# Patient Record
Sex: FEMALE | Race: WHITE | Employment: OTHER | URBAN - METROPOLITAN AREA
[De-identification: names, ages, dates, MRNs, and addresses within clinical notes are randomized per-mention and may not be internally consistent; named-entity substitution may affect disease eponyms.]

---

## 2017-05-02 ENCOUNTER — TRANSFERRED RECORDS (OUTPATIENT)
Dept: HEALTH INFORMATION MANAGEMENT | Facility: CLINIC | Age: 82
End: 2017-05-02

## 2017-05-02 LAB
ALT SERPL-CCNC: 46 U/L (ref 14–63)
AST SERPL-CCNC: 32 U/L (ref 15–37)
CREAT SERPL-MCNC: 0.93 MG/DL (ref 0.51–0.95)
GFR SERPL CREATININE-BSD FRML MDRD: 53 ML/MIN/1.73M2 (ref 60–150)
GLUCOSE SERPL-MCNC: 140 MG/DL (ref 74–100)
POTASSIUM SERPL-SCNC: 4.6 MMOL/L (ref 3.5–5.1)

## 2018-09-18 ENCOUNTER — RECORDS - HEALTHEAST (OUTPATIENT)
Dept: LAB | Facility: CLINIC | Age: 83
End: 2018-09-18

## 2018-09-19 LAB
ANION GAP SERPL CALCULATED.3IONS-SCNC: 6 MMOL/L (ref 5–18)
BUN SERPL-MCNC: 18 MG/DL (ref 8–28)
CALCIUM SERPL-MCNC: 9.4 MG/DL (ref 8.5–10.5)
CHLORIDE BLD-SCNC: 99 MMOL/L (ref 98–107)
CO2 SERPL-SCNC: 31 MMOL/L (ref 22–31)
CREAT SERPL-MCNC: 0.72 MG/DL (ref 0.6–1.1)
ERYTHROCYTE [DISTWIDTH] IN BLOOD BY AUTOMATED COUNT: 13.2 % (ref 11–14.5)
GFR SERPL CREATININE-BSD FRML MDRD: >60 ML/MIN/1.73M2
GLUCOSE BLD-MCNC: 137 MG/DL (ref 70–125)
HCT VFR BLD AUTO: 42.2 % (ref 35–47)
HGB BLD-MCNC: 14.2 G/DL (ref 12–16)
MCH RBC QN AUTO: 31.5 PG (ref 27–34)
MCHC RBC AUTO-ENTMCNC: 33.6 G/DL (ref 32–36)
MCV RBC AUTO: 94 FL (ref 80–100)
PLATELET # BLD AUTO: 183 THOU/UL (ref 140–440)
PMV BLD AUTO: 13 FL (ref 8.5–12.5)
POTASSIUM BLD-SCNC: 4.7 MMOL/L (ref 3.5–5)
RBC # BLD AUTO: 4.51 MILL/UL (ref 3.8–5.4)
SODIUM SERPL-SCNC: 136 MMOL/L (ref 136–145)
WBC: 6.2 THOU/UL (ref 4–11)

## 2018-12-14 ENCOUNTER — TRANSFERRED RECORDS (OUTPATIENT)
Dept: HEALTH INFORMATION MANAGEMENT | Facility: CLINIC | Age: 83
End: 2018-12-14

## 2018-12-14 LAB
CREAT SERPL-MCNC: 0.7 MG/DL (ref 0.51–0.95)
GLUCOSE SERPL-MCNC: 107 MG/DL (ref 74–100)
POTASSIUM SERPL-SCNC: 4.2 MMOL/L (ref 3.5–5.1)

## 2019-04-25 ENCOUNTER — RECORDS - HEALTHEAST (OUTPATIENT)
Dept: LAB | Facility: CLINIC | Age: 84
End: 2019-04-25

## 2019-04-25 LAB
ALBUMIN UR-MCNC: ABNORMAL MG/DL
APPEARANCE UR: ABNORMAL
BACTERIA #/AREA URNS HPF: ABNORMAL HPF
BILIRUB UR QL STRIP: NEGATIVE
COLOR UR AUTO: YELLOW
GLUCOSE UR STRIP-MCNC: NEGATIVE MG/DL
HGB UR QL STRIP: ABNORMAL
KETONES UR STRIP-MCNC: NEGATIVE MG/DL
LEUKOCYTE ESTERASE UR QL STRIP: ABNORMAL
NITRATE UR QL: POSITIVE
PH UR STRIP: 6 [PH] (ref 4.5–8)
RBC #/AREA URNS AUTO: ABNORMAL HPF
SP GR UR STRIP: 1.04 (ref 1–1.03)
SQUAMOUS #/AREA URNS AUTO: ABNORMAL LPF
UROBILINOGEN UR STRIP-ACNC: ABNORMAL
WBC #/AREA URNS AUTO: >100 HPF
WBC CLUMPS #/AREA URNS HPF: PRESENT /[HPF]

## 2019-04-27 LAB — BACTERIA SPEC CULT: ABNORMAL

## 2019-05-16 ENCOUNTER — HOSPITAL ENCOUNTER (INPATIENT)
Facility: CLINIC | Age: 84
LOS: 1 days | Discharge: HOME HEALTH | DRG: 641 | End: 2019-05-17
Attending: EMERGENCY MEDICINE | Admitting: HOSPITALIST
Payer: COMMERCIAL

## 2019-05-16 ENCOUNTER — APPOINTMENT (OUTPATIENT)
Dept: GENERAL RADIOLOGY | Facility: CLINIC | Age: 84
DRG: 641 | End: 2019-05-16
Attending: EMERGENCY MEDICINE
Payer: COMMERCIAL

## 2019-05-16 ENCOUNTER — APPOINTMENT (OUTPATIENT)
Dept: ULTRASOUND IMAGING | Facility: CLINIC | Age: 84
DRG: 641 | End: 2019-05-16
Attending: HOSPITALIST
Payer: COMMERCIAL

## 2019-05-16 ENCOUNTER — APPOINTMENT (OUTPATIENT)
Dept: CT IMAGING | Facility: CLINIC | Age: 84
DRG: 641 | End: 2019-05-16
Attending: EMERGENCY MEDICINE
Payer: COMMERCIAL

## 2019-05-16 DIAGNOSIS — I47.19 MULTIFOCAL ATRIAL TACHYCARDIA (H): ICD-10-CM

## 2019-05-16 DIAGNOSIS — R60.0 LOWER EXTREMITY EDEMA: ICD-10-CM

## 2019-05-16 DIAGNOSIS — J90 BILATERAL PLEURAL EFFUSION: ICD-10-CM

## 2019-05-16 DIAGNOSIS — R07.1 CHEST PAIN ON BREATHING: ICD-10-CM

## 2019-05-16 PROBLEM — I50.9 ACUTE HEART FAILURE (H): Status: ACTIVE | Noted: 2019-05-16

## 2019-05-16 LAB
ALBUMIN SERPL-MCNC: 2.9 G/DL (ref 3.4–5)
ALBUMIN UR-MCNC: 30 MG/DL
ALP SERPL-CCNC: 94 U/L (ref 40–150)
ALT SERPL W P-5'-P-CCNC: 21 U/L (ref 0–50)
ANION GAP SERPL CALCULATED.3IONS-SCNC: 6 MMOL/L (ref 3–14)
APPEARANCE UR: CLEAR
AST SERPL W P-5'-P-CCNC: 18 U/L (ref 0–45)
BASOPHILS # BLD AUTO: 0.1 10E9/L (ref 0–0.2)
BASOPHILS NFR BLD AUTO: 1.6 %
BILIRUB SERPL-MCNC: 0.7 MG/DL (ref 0.2–1.3)
BILIRUB UR QL STRIP: NEGATIVE
BUN SERPL-MCNC: 26 MG/DL (ref 7–30)
CALCIUM SERPL-MCNC: 8.8 MG/DL (ref 8.5–10.1)
CHLORIDE SERPL-SCNC: 105 MMOL/L (ref 94–109)
CO2 SERPL-SCNC: 31 MMOL/L (ref 20–32)
COLOR UR AUTO: YELLOW
CREAT SERPL-MCNC: 0.66 MG/DL (ref 0.52–1.04)
D DIMER PPP FEU-MCNC: 1.5 UG/ML FEU (ref 0–0.5)
DIFFERENTIAL METHOD BLD: NORMAL
EOSINOPHIL # BLD AUTO: 0.2 10E9/L (ref 0–0.7)
EOSINOPHIL NFR BLD AUTO: 2.3 %
ERYTHROCYTE [DISTWIDTH] IN BLOOD BY AUTOMATED COUNT: 13.8 % (ref 10–15)
GFR SERPL CREATININE-BSD FRML MDRD: 74 ML/MIN/{1.73_M2}
GLUCOSE SERPL-MCNC: 97 MG/DL (ref 70–99)
GLUCOSE UR STRIP-MCNC: NEGATIVE MG/DL
HCT VFR BLD AUTO: 38.7 % (ref 35–47)
HGB BLD-MCNC: 12.7 G/DL (ref 11.7–15.7)
HGB UR QL STRIP: NEGATIVE
IMM GRANULOCYTES # BLD: 0 10E9/L (ref 0–0.4)
IMM GRANULOCYTES NFR BLD: 0.1 %
INTERPRETATION ECG - MUSE: NORMAL
KETONES UR STRIP-MCNC: NEGATIVE MG/DL
LEUKOCYTE ESTERASE UR QL STRIP: ABNORMAL
LIPASE SERPL-CCNC: 50 U/L (ref 73–393)
LYMPHOCYTES # BLD AUTO: 1.1 10E9/L (ref 0.8–5.3)
LYMPHOCYTES NFR BLD AUTO: 15.6 %
MAGNESIUM SERPL-MCNC: 2 MG/DL (ref 1.6–2.3)
MCH RBC QN AUTO: 30 PG (ref 26.5–33)
MCHC RBC AUTO-ENTMCNC: 32.8 G/DL (ref 31.5–36.5)
MCV RBC AUTO: 91 FL (ref 78–100)
MONOCYTES # BLD AUTO: 0.5 10E9/L (ref 0–1.3)
MONOCYTES NFR BLD AUTO: 7.7 %
MUCOUS THREADS #/AREA URNS LPF: PRESENT /LPF
NEUTROPHILS # BLD AUTO: 5.1 10E9/L (ref 1.6–8.3)
NEUTROPHILS NFR BLD AUTO: 72.7 %
NITRATE UR QL: NEGATIVE
NRBC # BLD AUTO: 0 10*3/UL
NRBC BLD AUTO-RTO: 0 /100
NT-PROBNP SERPL-MCNC: 1011 PG/ML (ref 0–1800)
PH UR STRIP: 6.5 PH (ref 5–7)
PLATELET # BLD AUTO: 208 10E9/L (ref 150–450)
POTASSIUM SERPL-SCNC: 4.1 MMOL/L (ref 3.4–5.3)
PROT SERPL-MCNC: 6.4 G/DL (ref 6.8–8.8)
RBC # BLD AUTO: 4.24 10E12/L (ref 3.8–5.2)
RBC #/AREA URNS AUTO: 2 /HPF (ref 0–2)
SODIUM SERPL-SCNC: 142 MMOL/L (ref 133–144)
SOURCE: ABNORMAL
SP GR UR STRIP: 1.02 (ref 1–1.03)
SQUAMOUS #/AREA URNS AUTO: 1 /HPF (ref 0–1)
TROPONIN I SERPL-MCNC: <0.015 UG/L (ref 0–0.04)
TROPONIN I SERPL-MCNC: <0.015 UG/L (ref 0–0.04)
TSH SERPL DL<=0.005 MIU/L-ACNC: 1.04 MU/L (ref 0.4–4)
UROBILINOGEN UR STRIP-MCNC: 2 MG/DL (ref 0–2)
WBC # BLD AUTO: 7 10E9/L (ref 4–11)
WBC #/AREA URNS AUTO: 10 /HPF (ref 0–5)

## 2019-05-16 PROCEDURE — 93005 ELECTROCARDIOGRAM TRACING: CPT

## 2019-05-16 PROCEDURE — 25000132 ZZH RX MED GY IP 250 OP 250 PS 637: Performed by: HOSPITALIST

## 2019-05-16 PROCEDURE — 21000001 ZZH R&B HEART CARE

## 2019-05-16 PROCEDURE — 83880 ASSAY OF NATRIURETIC PEPTIDE: CPT | Performed by: EMERGENCY MEDICINE

## 2019-05-16 PROCEDURE — 96374 THER/PROPH/DIAG INJ IV PUSH: CPT | Mod: 59

## 2019-05-16 PROCEDURE — 74177 CT ABD & PELVIS W/CONTRAST: CPT

## 2019-05-16 PROCEDURE — 80053 COMPREHEN METABOLIC PANEL: CPT | Performed by: EMERGENCY MEDICINE

## 2019-05-16 PROCEDURE — 96361 HYDRATE IV INFUSION ADD-ON: CPT

## 2019-05-16 PROCEDURE — 25000128 H RX IP 250 OP 636: Performed by: EMERGENCY MEDICINE

## 2019-05-16 PROCEDURE — 84484 ASSAY OF TROPONIN QUANT: CPT | Performed by: HOSPITALIST

## 2019-05-16 PROCEDURE — 71046 X-RAY EXAM CHEST 2 VIEWS: CPT

## 2019-05-16 PROCEDURE — 25000128 H RX IP 250 OP 636: Performed by: HOSPITALIST

## 2019-05-16 PROCEDURE — 87086 URINE CULTURE/COLONY COUNT: CPT | Performed by: EMERGENCY MEDICINE

## 2019-05-16 PROCEDURE — 81001 URINALYSIS AUTO W/SCOPE: CPT | Performed by: EMERGENCY MEDICINE

## 2019-05-16 PROCEDURE — 25800030 ZZH RX IP 258 OP 636: Performed by: EMERGENCY MEDICINE

## 2019-05-16 PROCEDURE — 25000125 ZZHC RX 250: Performed by: EMERGENCY MEDICINE

## 2019-05-16 PROCEDURE — 99223 1ST HOSP IP/OBS HIGH 75: CPT | Mod: AI | Performed by: HOSPITALIST

## 2019-05-16 PROCEDURE — 84484 ASSAY OF TROPONIN QUANT: CPT | Performed by: EMERGENCY MEDICINE

## 2019-05-16 PROCEDURE — 85379 FIBRIN DEGRADATION QUANT: CPT | Performed by: EMERGENCY MEDICINE

## 2019-05-16 PROCEDURE — 83690 ASSAY OF LIPASE: CPT | Performed by: EMERGENCY MEDICINE

## 2019-05-16 PROCEDURE — 71260 CT THORAX DX C+: CPT

## 2019-05-16 PROCEDURE — 99285 EMERGENCY DEPT VISIT HI MDM: CPT | Mod: 25

## 2019-05-16 PROCEDURE — 83735 ASSAY OF MAGNESIUM: CPT | Performed by: EMERGENCY MEDICINE

## 2019-05-16 PROCEDURE — 93970 EXTREMITY STUDY: CPT

## 2019-05-16 PROCEDURE — 84443 ASSAY THYROID STIM HORMONE: CPT | Performed by: HOSPITALIST

## 2019-05-16 PROCEDURE — 85025 COMPLETE CBC W/AUTO DIFF WBC: CPT | Performed by: EMERGENCY MEDICINE

## 2019-05-16 RX ORDER — METOPROLOL TARTRATE 1 MG/ML
5 INJECTION, SOLUTION INTRAVENOUS ONCE
Status: DISCONTINUED | OUTPATIENT
Start: 2019-05-16 | End: 2019-05-16

## 2019-05-16 RX ORDER — PROCHLORPERAZINE MALEATE 5 MG
5 TABLET ORAL EVERY 6 HOURS PRN
Status: DISCONTINUED | OUTPATIENT
Start: 2019-05-16 | End: 2019-05-17 | Stop reason: HOSPADM

## 2019-05-16 RX ORDER — POTASSIUM CL/LIDO/0.9 % NACL 10MEQ/0.1L
10 INTRAVENOUS SOLUTION, PIGGYBACK (ML) INTRAVENOUS
Status: DISCONTINUED | OUTPATIENT
Start: 2019-05-16 | End: 2019-05-17 | Stop reason: HOSPADM

## 2019-05-16 RX ORDER — POTASSIUM CHLORIDE 7.45 MG/ML
10 INJECTION INTRAVENOUS
Status: DISCONTINUED | OUTPATIENT
Start: 2019-05-16 | End: 2019-05-17 | Stop reason: HOSPADM

## 2019-05-16 RX ORDER — MAGNESIUM SULFATE HEPTAHYDRATE 40 MG/ML
2 INJECTION, SOLUTION INTRAVENOUS DAILY PRN
Status: DISCONTINUED | OUTPATIENT
Start: 2019-05-16 | End: 2019-05-17 | Stop reason: HOSPADM

## 2019-05-16 RX ORDER — LIDOCAINE 40 MG/G
CREAM TOPICAL
Status: DISCONTINUED | OUTPATIENT
Start: 2019-05-16 | End: 2019-05-17 | Stop reason: HOSPADM

## 2019-05-16 RX ORDER — ONDANSETRON 4 MG/1
4 TABLET, ORALLY DISINTEGRATING ORAL EVERY 6 HOURS PRN
Status: DISCONTINUED | OUTPATIENT
Start: 2019-05-16 | End: 2019-05-17 | Stop reason: HOSPADM

## 2019-05-16 RX ORDER — NALOXONE HYDROCHLORIDE 0.4 MG/ML
.1-.4 INJECTION, SOLUTION INTRAMUSCULAR; INTRAVENOUS; SUBCUTANEOUS
Status: DISCONTINUED | OUTPATIENT
Start: 2019-05-16 | End: 2019-05-17 | Stop reason: HOSPADM

## 2019-05-16 RX ORDER — FUROSEMIDE 10 MG/ML
40 INJECTION INTRAMUSCULAR; INTRAVENOUS ONCE
Status: COMPLETED | OUTPATIENT
Start: 2019-05-16 | End: 2019-05-16

## 2019-05-16 RX ORDER — POTASSIUM CHLORIDE 1.5 G/1.58G
20-40 POWDER, FOR SOLUTION ORAL
Status: DISCONTINUED | OUTPATIENT
Start: 2019-05-16 | End: 2019-05-17 | Stop reason: HOSPADM

## 2019-05-16 RX ORDER — POTASSIUM CHLORIDE 29.8 MG/ML
20 INJECTION INTRAVENOUS
Status: DISCONTINUED | OUTPATIENT
Start: 2019-05-16 | End: 2019-05-16

## 2019-05-16 RX ORDER — ONDANSETRON 2 MG/ML
4 INJECTION INTRAMUSCULAR; INTRAVENOUS EVERY 6 HOURS PRN
Status: DISCONTINUED | OUTPATIENT
Start: 2019-05-16 | End: 2019-05-17 | Stop reason: HOSPADM

## 2019-05-16 RX ORDER — MAGNESIUM SULFATE HEPTAHYDRATE 40 MG/ML
4 INJECTION, SOLUTION INTRAVENOUS EVERY 4 HOURS PRN
Status: DISCONTINUED | OUTPATIENT
Start: 2019-05-16 | End: 2019-05-17 | Stop reason: HOSPADM

## 2019-05-16 RX ORDER — PROCHLORPERAZINE 25 MG
12.5 SUPPOSITORY, RECTAL RECTAL EVERY 12 HOURS PRN
Status: DISCONTINUED | OUTPATIENT
Start: 2019-05-16 | End: 2019-05-17 | Stop reason: HOSPADM

## 2019-05-16 RX ORDER — METOCLOPRAMIDE HYDROCHLORIDE 5 MG/ML
5 INJECTION INTRAMUSCULAR; INTRAVENOUS EVERY 6 HOURS PRN
Status: DISCONTINUED | OUTPATIENT
Start: 2019-05-16 | End: 2019-05-17 | Stop reason: HOSPADM

## 2019-05-16 RX ORDER — POTASSIUM CHLORIDE 1500 MG/1
20-40 TABLET, EXTENDED RELEASE ORAL
Status: DISCONTINUED | OUTPATIENT
Start: 2019-05-16 | End: 2019-05-17 | Stop reason: HOSPADM

## 2019-05-16 RX ORDER — METOCLOPRAMIDE 5 MG/1
5 TABLET ORAL EVERY 6 HOURS PRN
Status: DISCONTINUED | OUTPATIENT
Start: 2019-05-16 | End: 2019-05-17 | Stop reason: HOSPADM

## 2019-05-16 RX ORDER — ACETAMINOPHEN 325 MG/1
650 TABLET ORAL EVERY 4 HOURS PRN
Status: DISCONTINUED | OUTPATIENT
Start: 2019-05-16 | End: 2019-05-17 | Stop reason: HOSPADM

## 2019-05-16 RX ORDER — IOPAMIDOL 755 MG/ML
52 INJECTION, SOLUTION INTRAVASCULAR ONCE
Status: COMPLETED | OUTPATIENT
Start: 2019-05-16 | End: 2019-05-16

## 2019-05-16 RX ADMIN — FUROSEMIDE 40 MG: 10 INJECTION, SOLUTION INTRAVENOUS at 20:24

## 2019-05-16 RX ADMIN — ACETAMINOPHEN 650 MG: 325 TABLET, FILM COATED ORAL at 22:58

## 2019-05-16 RX ADMIN — SODIUM CHLORIDE, POTASSIUM CHLORIDE, SODIUM LACTATE AND CALCIUM CHLORIDE 500 ML: 600; 310; 30; 20 INJECTION, SOLUTION INTRAVENOUS at 16:29

## 2019-05-16 RX ADMIN — SODIUM CHLORIDE 80 ML: 9 INJECTION, SOLUTION INTRAVENOUS at 17:47

## 2019-05-16 RX ADMIN — IOPAMIDOL 52 ML: 755 INJECTION, SOLUTION INTRAVENOUS at 17:47

## 2019-05-16 SDOH — HEALTH STABILITY: MENTAL HEALTH: HOW OFTEN DO YOU HAVE A DRINK CONTAINING ALCOHOL?: NEVER

## 2019-05-16 ASSESSMENT — ENCOUNTER SYMPTOMS
CHILLS: 1
BLOOD IN STOOL: 0
DIARRHEA: 0
PALPITATIONS: 0
HEADACHES: 0
WOUND: 0
NAUSEA: 0
VOMITING: 0
MYALGIAS: 0
WHEEZING: 0
ABDOMINAL DISTENTION: 0
SHORTNESS OF BREATH: 1
CONSTIPATION: 0
COUGH: 0
FEVER: 0
ABDOMINAL PAIN: 1
APPETITE CHANGE: 1

## 2019-05-16 ASSESSMENT — MIFFLIN-ST. JEOR: SCORE: 842.2

## 2019-05-16 NOTE — ED PROVIDER NOTES
"  History     Chief Complaint:  Abdominal Pain and Chest Pain      HPI   Ingrid Portillo is a 89 year old female who presents to the emergency department for evaluation of abdominal pain and chest pain. ***     Allergies:  ***  Allergies not on file     Medications:    ***    No current outpatient medications on file.    Problem List:    ***  There are no active problems to display for this patient.       Past Medical History:    ***  History reviewed. No pertinent past medical history.    Past Surgical History:    ***  History reviewed. No pertinent surgical history.    Family History:    ***  History reviewed. No pertinent family history.    Social History:  The patient was accompanied to the ED by ***.  Smoking Status: Current every day smoker 0.25 PPD  Smokeless Tobacco: Never  Alcohol Use: Never  Drug use: Never     Review of Systems  ***    Physical Exam     Patient Vitals for the past 24 hrs:   BP Temp Temp src Pulse Heart Rate Resp SpO2 Height Weight   05/16/19 1522 (!) 140/94 -- -- -- 114 19 96 % -- --   05/16/19 1500 -- -- -- -- 117 24 -- -- --   05/16/19 1417 123/79 97.8  F (36.6  C) Oral 85 -- 20 91 % 1.626 m (5' 4\") 46.7 kg (103 lb)      Physical Exam  ***  Emergency Department Course     ECG:  ***    Imaging:  Radiology findings were communicated with the {Patient/MD:[id number]} who voiced understanding of the findings.  ***    Laboratory:  Laboratory findings were communicated with the {Patient/MD:[id number]} who voiced understanding of the findings.  ***    Procedures:  ***    Interventions:  ***  Medications - No data to display     Emergency Department Course:  Nursing notes and vitals reviewed.    1604: I performed an exam of the patient as documented above.     I personally reviewed the laboratory *** results with the {PATIENT, FAMILY MEMBER, CAREGIVER:[id number]} and answered all related questions prior to *** discharge.    Impression & Plan       {trauma activation?:457211::\" \"}  CMS Diagnoses: " "{Sepsis/Septic Shock/Stemi/Stroke:719212::\" \"}       Medical Decision Making:  ***  Critical Care time:  {none or minutes:379614::\"none\"}    Diagnosis:  No diagnosis found.    Disposition:  {discharged to home/discharged to home with.../Admitted to...:618396}    Discharge Medications:     Medication List      There are no discharge medications for this visit.           Heather Saravia  5/16/2019    EMERGENCY DEPARTMENT    "

## 2019-05-16 NOTE — ED TRIAGE NOTES
Pt woke up at 530am with epigastric pain. Has not gone away. C/o SOB/WAITE. Given nitroX2 and 162mg asa by EMS.

## 2019-05-16 NOTE — ED PROVIDER NOTES
History     Chief Complaint:  Abdominal Pain and Chest Pain    HPI:  The history is provided by the patient.      Ingrid Portillo is a 89 year old female who presents with 2 days of increasing chest and abdominal discomfort.  Patient notes that there is a heaviness over the lower aspect of the chest and epigastric region as well as pain diffusely throughout the abdomen.  She notes symptoms are worse with movement but cannot identify any other exacerbating or relieving factors.  She denies any fevers.  No nausea vomiting or diarrhea.  She reports normal urination and bowel movements.  She notes chronic urinary frequency but no change in this.  She denies any fall or trauma.  She notes that her chest pain is pleuritic and associated with shortness of breath.  No cough.  Patient also endorses increased right lower extremity swelling over the last week.  She denies any radiation of pain.  She reports that she is never had symptoms like this previously.  She denies any other symptoms at this time.    Allergies:  No known drug allergies      Medications:    The patient is not currently taking any prescribed medications.     Past Medical History:    The patient does not have any past pertinent medical history.     Past Surgical History:    History reviewed. No pertinent surgical history.     Family History:    History reviewed. No pertinent family history.      Social History:  The patient is accompanied to the ED by family  Smoking status: current every day smoker, 0.25 ppd, started at age 18  Alcohol use: Negative      Review of Systems   Constitutional: Positive for appetite change and chills. Negative for fever.   HENT: Negative for congestion.    Respiratory: Positive for shortness of breath. Negative for cough and wheezing.    Cardiovascular: Positive for chest pain and leg swelling. Negative for palpitations.   Gastrointestinal: Positive for abdominal pain. Negative for abdominal distention, blood in stool,  "constipation, diarrhea, nausea and vomiting.   Musculoskeletal: Negative for myalgias.   Skin: Negative for rash and wound.   Neurological: Negative for syncope and headaches.   All other systems reviewed and are negative.      Physical Exam     Patient Vitals for the past 24 hrs:   BP Temp Temp src Pulse Heart Rate Resp SpO2 Height Weight   05/16/19 1850 (!) 173/104 -- -- -- 108 18 -- -- --   05/16/19 1830 -- -- -- -- 110 12 -- -- --   05/16/19 1800 (!) 161/97 -- -- -- 161 14 94 % -- --   05/16/19 1730 -- -- -- -- 101 24 -- -- --   05/16/19 1634 (!) 145/94 -- -- -- 105 13 -- -- --   05/16/19 1522 (!) 140/94 -- -- -- 114 19 96 % -- --   05/16/19 1500 -- -- -- -- 117 24 -- -- --   05/16/19 1417 123/79 97.8  F (36.6  C) Oral 85 -- 20 91 % 1.626 m (5' 4\") 46.7 kg (103 lb)        Physical Exam  General: Well appearing, nontoxic. Resting comfortably  Head:  Scalp, face, and head appear normal  Eyes:  Pupils are equal, round, and reactive to light    Conjunctivae non-injected and sclerae white  ENT:    The external nose is normal    Pinnae are normal    The oropharynx is normal, mucous membranes moist    Uvula is in the midline  Neck:  Normal range of motion    There is no rigidity noted    Trachea is in the midline  CV:  Tachycardic and irregularly irregular    Normal S1/S2, no S3/S4    No murmur or rub. Radial pulses 2+ bilaterally   Resp:  Lungs are equal bilaterally, breath sounds distant.    There is no tachypnea    No increased work of breathing    No wheezing, or rhonchi.  Coarse expiratory rales in the bilateral lung fields.  GI:  Abdomen is soft, no rigidity or guarding    Mild distension, or mass    Mild diffuse tenderness. No rebound tenderness   MS:  Normal muscular tone    Symmetric motor strength    3+ right lower extremity pitting edema. No edema left lower extremity. No calf pain.  Skin:  No rash or acute skin lesions noted  Neuro: Awake and alert.  No facial droop.  Strength 5 out of 5 and intact " throughout.    Speech is normal and fluent    Moves all extremities spontaneously  Psych:  Normal affect.  Appropriate interactions.      Emergency Department Course     ECG (14:21:49):  Rate 118 bpm. ND interval 168. QRS duration 68. QT/QTc 342/479. P-R-T axes 54 -59 54.   Sinus tachycardia & frequent PACs  Left axis deviation  Low voltage QRS  Cannot rule out anteroseptal infarct, age undetermined  Abnormal ECG  Interpreted at 1603 by Oj Kim MD.     Imaging:  Radiographic findings were communicated with the patient who voiced understanding of the findings.    XR Chest 2 Views  Impression: Small bilateral pleural effusions and associated  atelectasis and/or infiltrate, left worse than right.  As read by Radiology.     CT Chest (PE) Abdomen Pelvis with contrast  Impression:   1. No visualized pulmonary embolism. Possible pulmonary hypertension.  2. Moderate bilateral pleural effusions and areas of bilateral  atelectasis. Mild cardiomegaly.  3. 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 Emphysema.  As read by Radiology.     Laboratory:  CBC: WNL (WBC 7.0, HGB 12.7, )   CMP: albumin 2.9, protein total 6.4, o/w WNL (Creatinine 0.66)  Troponin I: <0.015  BNP: 1011  Lipase: 50  Magnesium: 2.0  D dimer: 1.5  UA reflex to microscopic and culture: protein albumin 30, leukocyte esterase moderate, WBC/HPF 10, mucous urine present  Urine culture: Pending.    Interventions:  1629: lactated ringers bolus 500 mLs, IV     Emergency Department Course:  Past medical records, nursing notes, and vitals reviewed.  1608: I performed an exam of the patient and obtained history, as documented above.  IV started and blood drawn for laboratory testing. Results are as above.    The patient was sent for X-ray imaging while in the emergency department, findings above.      1840: I spoke with Dr. Enriquez of vascular surgery.    1900: I rechecked the  "patient. Explained findings to the patient and family.     Findings and plan explained to the Patient and family who consents to admission.     Discussed the patient with Dr. Kinney, who will admit the patient to a [facility] bed for further monitoring, evaluation, and treatment.      Impression & Plan      Medical Decision Making:  Ingrid Portillo is a 89 year old female who denies any past medical history other than \"irregular heartbeat\" who presents with chest and abdominal pain which seems to be pleuritic in nature. On my evaluation, she is pleasant, awake and alert. She appears younger than her stated age and is well-appearing. She is afebrile and non-toxic. Her abdominal exam is with mild diffuse tenderness but no evidence of peritonitis or acute surgical emergency. She has no increased work of breathing or hypoxia. On exam rales are heard on the bilateral bases. Broad differential diagnosis is considered including CHF, acute coronary syndrome, pleural effusions, pneumonia, pneumothorax, diaphragmatic irritation from an intraabdominal process, bowel obstruction, cholecystitis, appendicitis, pyelonephritis, or other intra-abdominal process. Workup in the ED revealed telemetry and ECG with what appears to be a likely multifocal atrial tachycardia with a heart rate that is persistently tachycardic. No ECG evidence of acute ischemia. ECG is not consistent with atrial fibrillation. Laboratory studies in the ED revealed an elevated D dimer at 1.5. CMP is otherwise unremarkable. CBC is normal. Troponin is undetectable. Urinalysis is negative for infection. Given the elevated D dimer and her symptoms, a decision was made to obtain CT scan of the chest abdomen pelvis, which revealed bilateral chest pleural effusions and a possible descending thoracic aortic mural thrombus. Given the finding of a small mural thrombus, I discussed the case with vascular surgery, who felt this was more likely to be a noncalcified atheroma rather than " a true thrombus. Given her age, the small size of the thrombus and lack of any associated dissection or aneurysm, this would require no further follow up, intervention, or treatment per vascular surgery. Given her pleural effusions and lower extremity edema, there is concern for new onset of heart failure in the setting of rapid atrial tachycardia. Patient has no other known history of cardiac disease. I have no prior records to review here at [place] or in Care Everywhere. This case was discussed with the hospitalist who will admit the patient for further monitoring, evaluation, and treatment. The patient was agreeable with this plan and admitted in stable condition.     Critical Care time:  none    Diagnosis:    ICD-10-CM    1. Chest pain on breathing R07.1    2. Bilateral pleural effusion J90    3. Lower extremity edema R60.0    4. Multifocal atrial tachycardia (H) I47.1        Disposition:  Admitted to hospital, [facility] bed      I, Megan Beh, am serving as a scribe at 4:08 PM on 5/16/2019 to document services personally performed by Oj Kim MD based on my observations and the provider's statements to me.      Megan Beh  5/16/2019    EMERGENCY DEPARTMENT       Oj Kim MD  05/18/19 1021

## 2019-05-16 NOTE — ED NOTES
Bed: ED12  Expected date:   Expected time:   Means of arrival:   Comments:  [facility] - 89 F abd pain eta 1416

## 2019-05-17 VITALS
TEMPERATURE: 97.5 F | BODY MASS INDEX: 16.82 KG/M2 | SYSTOLIC BLOOD PRESSURE: 141 MMHG | HEART RATE: 85 BPM | DIASTOLIC BLOOD PRESSURE: 69 MMHG | WEIGHT: 98.5 LBS | RESPIRATION RATE: 18 BRPM | HEIGHT: 64 IN | OXYGEN SATURATION: 91 %

## 2019-05-17 LAB
BACTERIA SPEC CULT: NORMAL
Lab: NORMAL
SPECIMEN SOURCE: NORMAL

## 2019-05-17 PROCEDURE — 99238 HOSP IP/OBS DSCHRG MGMT 30/<: CPT | Performed by: INTERNAL MEDICINE

## 2019-05-17 ASSESSMENT — ACTIVITIES OF DAILY LIVING (ADL)
ADLS_ACUITY_SCORE: 19

## 2019-05-17 ASSESSMENT — MIFFLIN-ST. JEOR: SCORE: 821.79

## 2019-05-17 NOTE — H&P
[facility]    History and Physical - Hospitalist Service       Date of Admission:  5/16/2019    Assessment & Plan   Ingrid Portillo is a 89 year old female with no diagnosed chronic illnesses and on no medications who presented to the emergency room with 2 days of worsening shortness of breath and pleuritic chest pain mostly in the right with some extension into the epigastric area.  She is also been having right leg pain and swelling.  CT PE study as well as CT the abdomen and pelvis showed bilateral pleural effusions with atelectasis.  No PE.  There was an abnormal area in the thoracic aortic aneurysm which appeared to contain thrombus.  The patient has an elevated BNP, negative troponin and her other lab work is unremarkable.  [place] [facility] [place]    Shortness of breath   Possible multifocal atrial tachycardia with possible history of atrial fibrillation per the patient's family  The patient has bilateral pleural effusions and an elevated BNP consistent with heart failure.  She has no history of heart failure.  We will give a dose of IV furosemide tonight and obtain a transthoracic echocardiogram tomorrow.  She is not hypoxic.  There is no evidence of pulmonary embolism or pneumonia on the CT scan.  See goals of care below.    Right lower extremity pain and swelling   Lower extremity venous ultrasound ordered.    Possible thrombus versus plaque in the thoracic aorta  Dr. Kim discussed this with Dr. Enriquez who was on-call for vascular surgery.  He does not feel that this represents active hemorrhage or ruptured aneurysm but rather soft plaque.    Goals of care  I spoke with the patient regarding CODE STATUS and she told me that she wants to be DNR/DNI.  She also stated that she would probably not agree to take any medications after discharge.  She said that she was anxious to get to having to be with her .  She did agree to have the echocardiogram and lower  extremity ultrasound and receive a dose of IV furosemide.     Diet: Cardiac   DVT Prophylaxis: Pneumatic Compression Devices  Stephenson Catheter: not present  Code Status: DNR/DNI    Disposition Plan   Expected discharge: 2 - 3 days, recommended to prior living arrangement once cardiac status is stable .  Entered: Gus Kinney MD 05/16/2019, 7:46 PM     The patient's care was discussed with the Patient and Patient's Family.    Gus Kinney MD  [facility]    ______________________________________________________________________    Chief Complaint   Shortness of breath     History is obtained from the patient, emergency department physician and patient's children    History of Present Illness   Ingrid Portillo is a 89 year old female who who essentially has no diagnosed chronic medical problems and takes no medications.  She lives independently.  Last Christmas she fell and broke her right hip and underwent open reduction internal fixation.  She has been doing well since.  For the past couple of days the patient has been feeling a little short of breath and has some discomfort when she takes a deep breath.  She also has some pain and swelling in her right leg.  She has no fever, cough or chest pressure.  She has no nausea vomiting or diarrhea.  The discomfort in her chest extends somewhat into the epigastric region.  She is not having any heartburn difficulty swallowing or eating.  She has chronic urinary frequency.  Her family took her to the emergency room because of the breathing difficulty.  In the emergency room her initial blood pressure was 123/79 temperature was 97.8 pulse 85 respiratory 20 oxygen saturation 91%.  Subsequently she has had blood pressures between 140 and 170 systolic.  Heart rate has been varying from the 110's with one episode of 160.  Her EKG showed sinus tachycardia with frequent PAC's.  On the monitor she is been having what appears to be possible atrial  fibrillation or multi focal atrial tachycardia.  On exam in the emergency room she was in no distress she was tachycardic and irregular no murmur was heard.  She had no wheezing or rales on exam.  She did what was described as 3+ right lower extremity pitting edema.  A chest x-ray showed small bilateral pleural effusions with associated atelectasis and infiltrate.  CT PE study as well as CT abdomen and pelvis showed no pulmonary embolism.  There are moderate bilateral effusions with atelectasis.  There was evidence of emphysema.  There was an atheromatous aorta with a 1.5 cm area of focal thrombus or blood along the anterior wall of the lower thoracic aorta.  Labs notable for normal white count hemoglobin 12.7 g platelet count 208,000, creatinine 0.66 lipase 50 d-dimer 1.5 BNP 1011 troponin less than 0.015.  Urinalysis showed 10 white cells moderate leukocyte esterase.  Patient received 500 mL of lactated Ringer's.  Dr. Enriquez from vascular surgery was contacted regarding the 1.5 cm area in the thoracic aorta which appeared to contain thrombus.  Dr. Enriquez felt that this area was likely plaque and did not represent any rupture of the aorta.    Review of Systems    The 10 point Review of Systems is negative other than noted in the HPI or here.     Past Medical History    I have reviewed this patient's medical history and updated it with pertinent information if needed.   Past Medical History:   Diagnosis Date     Tobacco use        Past Surgical History   I have reviewed this patient's surgical history and updated it with pertinent information if needed.  Past Surgical History:   Procedure Laterality Date     OPEN REDUCTION INTERNAL FIXATION HIP Right 12/2018     OPEN REDUCTION INTERNAL FIXATION RODDING INTRAMEDULLAR FEMUR FRACTURE TABLE Left 2016       Social History   I have reviewed this patient's social history and updated it with pertinent information if needed.  Social History     Tobacco Use     Smoking status:  Current Every Day Smoker     Packs/day: 0.25     Smokeless tobacco: Never Used     Tobacco comment: started at age 18    Substance Use Topics     Alcohol use: Never     Frequency: Never     Drug use: Never       Family History   I have reviewed this patient's family history and updated it with pertinent information if needed.   Family History   Problem Relation Age of Onset     Heart Failure Mother      Lung Cancer Sister      Diabetes No family hx of        Prior to Admission Medications   None     Allergies   Allergies not on file    Physical Exam   Vital Signs: Temp: 97.8  F (36.6  C) Temp src: Oral BP: (!) 173/104 Pulse: 85 Heart Rate: 108 Resp: 18 SpO2: 94 % O2 Device: None (Room air)    Weight: 103 lbs 0 oz    Constitutional: awake, alert, cooperative, no apparent distress, and appears stated age  Eyes: Lids and lashes normal, pupils equal, round, sclera clear, conjunctiva normal  ENT: Normocephalic, without obvious abnormality, atraumatic, oral pharynx with moist mucous membranes  Respiratory: No increased work of breathing, good air exchange, bibasilar rales present   Cardiovascular: Irregular and tachycardic.  No murmur heard.  GI: No scars, normal bowel sounds, soft, non-distended, non-tender, no masses palpated, no hepatosplenomegally  Skin: no rashes and no jaundice  Musculoskeletal: There is no redness, warmth, or swelling of the joints.  There is 1+ lower extremity edema in the right leg.  The right leg is noticeably greater in circumference than the left leg.  There is some tenderness in the right calf.  Neurologic: Awake, alert, oriented to name, place and time.  Cranial nerves II-XII are grossly intact.  Motor is 5 out of 5 bilaterally.  Neuropsychiatric: General: normal, calm and normal eye contact    Data   Data reviewed today: I reviewed all medications, new labs and imaging results over the last 24 hours. I personally reviewed the chest x-ray image(s) showing atelectasis versus effusion  .    Recent Labs   Lab 05/16/19  1436   WBC 7.0   HGB 12.7   MCV 91         POTASSIUM 4.1   CHLORIDE 105   CO2 31   BUN 26   CR 0.66   ANIONGAP 6   MARIAJOSE 8.8   GLC 97   ALBUMIN 2.9*   PROTTOTAL 6.4*   BILITOTAL 0.7   ALKPHOS 94   ALT 21   AST 18   LIPASE 50*   TROPI <0.015     Recent Results (from the past 24 hour(s))   XR Chest 2 Views    Narrative    CHEST TWO VIEWS 5/16/2019 4:47 PM     HISTORY: Chest pain, bilateral rales.    COMPARISON: None.     FINDINGS: Small bilateral pleural effusions, left worse than right.  Mild associated compressive atelectasis and/or infiltrate. Mild  scattered interstitial and/or fibrotic changes. The cardiac silhouette  is not enlarged. Pulmonary vasculature is unremarkable.      Impression    IMPRESSION: Small bilateral pleural effusions and associated  atelectasis and/or infiltrate, left worse than right.    CARLEE MORENO MD   CT Chest (PE) Abdomen Pelvis w Contrast    Narrative    CT CHEST PE ABDOMEN AND PELVIS WITH CONTRAST 5/16/2019 6:00 PM     HISTORY: Abnormal findings in other body fluids and substances. Chest  pain, abdominal pain, elevated D-dimer.      TECHNIQUE: Volumetric acquisition through chest, abdomen and pelvis  with IV contrast.   52mL Isovue-370  Radiation dose for this scan was  reduced using automated exposure control, adjustment of the mA and/or  kV according to patient size, or iterative reconstruction technique.    COMPARISON: None.    FINDINGS:   Chest: No visualized pulmonary embolism. Slightly prominent central  pulmonary arteries may indicate pulmonary artery hypertension.  Atheromatous changes of the thoracic aorta. In the lower thoracic  aorta there is a focal area of mural thrombus/blood clot along the  anterior wall measuring up to 1.5 cm. Mild cardiomegaly. Coronary  artery calcifications.    Emphysema. Moderate pleural effusions bilaterally. Bibasilar opacities  likely atelectasis. Mild atelectasis in the right middle lobe  and  lingula.    Abdomen and pelvis: Liver, gallbladder, spleen, pancreas, adrenal  glands and kidneys demonstrate no worrisome findings. 3.2 cm cyst  arising from the lower left kidney. Mild ptosis of the right kidney.  No hydronephrosis. Possible slight thickening of the left adrenal  gland.    Uterus is present with small calcified fibroids. Atheromatous changes  of the abdominal aorta and its branches, moderate stool in the colon.  Colonic diverticulosis. No bowel obstruction, ascites or focal  inflammatory changes demonstrated. Bone windows demonstrate moderate  degenerative changes in the visualized spine. Moderate loss of height  of T6 and to a lesser degree T8, age indeterminate. There is also mild  loss of height of T12 and L4. Old proximal femur fracture deformities  with compression screw fixation.    IMPRESSION  1. No visualized pulmonary embolism. Possible pulmonary hypertension.  2. Moderate bilateral pleural effusions and areas of bilateral  atelectasis. Mild cardiomegaly.  3. 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 Emphysema.

## 2019-05-17 NOTE — PROGRESS NOTES
Patient receives RN services via [facility]. Discharge orders and H & P faxed to [facility] at [phone].

## 2019-05-17 NOTE — PHARMACY-ADMISSION MEDICATION HISTORY
Admission medication history interview status for the 5/16/2019  admission is complete. See EPIC admission navigator for prior to admission medications     Medication history source reliability:Good- patient    Actions taken by pharmacist (provider contacted, etc):None     Additional medication history information not noted on PTA med list : She reports only taking some vitamins but no other medications.    Medication reconciliation/reorder completed by provider prior to medication history? No    Time spent in this activity: 5 min    Prior to Admission medications    Not on File

## 2019-05-17 NOTE — PLAN OF CARE
A&Ox4 w/ VSS. RA. Tele is atrial tachycardia. Pain in rib/ABD - Tylenol given w/ relief. Lasix dose given x1- diuresing very well. 2+ edema in right leg/ankle/foot, 1+ edema in left leg/ankle/foot. US of Abhishek. Lower extremities completed. Plan for echo today 5/17. Will continue POC.

## 2019-05-17 NOTE — DISCHARGE SUMMARY
"[facility]  Hospitalist Discharge Summary       Date of Admission:  5/16/2019  Date of Discharge:  5/17/2019 12:09 PM  Discharging Provider: Frankie Everett DO      Discharge Diagnoses   1. Shortness of breath, likely related to fluid overload  2. Possible MAT     Follow-ups Needed After Discharge   Follow-up Appointments     Follow-up and recommended labs and tests       Follow up with primary care provider, Physician No Ref-Primary, within   2-4 weeks, for hospital follow- up. No follow up labs or test are needed.               Unresulted Labs Ordered in the Past 30 Days of this Admission     Date and Time Order Name Status Description    5/16/2019 1718 Urine Culture Aerobic Bacterial Preliminary       These results will be followed up by PCP     Discharge Disposition   Discharged to home  Condition at discharge: Stable    Hospital Course   Patient had improvement of her breathing with Lasix.  CT scan was fairly unremarkable.  Lower extremity US did not show any evidence of DVT.  Suspect her symptoms may be due to fluid overload.  Had long discussion with patient about her goals.  Stated she would not start any new medications and understood the risk of this.  She also stated that she was 89 and \"ready to be with my  in [place]\" but had no suicidal ideation.  I discussed with her that I felt this was appropriate and given that she did not want to pursue things further I discontinued the echocardiogram.  She was able to ambulate with a walker which is her baseline.  She was discharged to home in stable condition     Consultations This Hospital Stay   None    Code Status   DNR/DNI    Time Spent on this Encounter   I, Frankie Everett, personally saw the patient today and spent less than or equal to 30 minutes discharging this patient.       Frankie Everett DO  [facility]  ______________________________________________________________________    Physical Exam   Vital " Signs: Temp: 97.5  F (36.4  C) Temp src: Oral BP: 141/69 Pulse: 85 Heart Rate: 95 Resp: 18 SpO2: 91 % O2 Device: None (Room air)    Weight: 98 lbs 8 oz  General Appearance: Resting comfortably.  NAD   Respiratory: Clear to auscultation.  No respiratory distress  Cardiovascular: RRR.  No murmurs  GI: Bowel sounds present  Skin: No rashes.  No cyanosis   Other: No edema         Primary Care Physician   Physician No Ref-Primary    Discharge Orders      Reason for your hospital stay    SOB     Follow-up and recommended labs and tests     Follow up with primary care provider, Physician No Ref-Primary, within 2-4 weeks, for hospital follow- up. No follow up labs or test are needed.     Activity    Your activity upon discharge: activity as tolerated     Diet    Follow this diet upon discharge: Orders Placed This Encounter      Combination Diet Low Saturated Fat Na <2400mg Diet, No Caffeine Diet       Significant Results and Procedures   Most Recent 3 CBC's:  Recent Labs   Lab Test 05/16/19  1436   WBC 7.0   HGB 12.7   MCV 91        Most Recent 3 BMP's:  Recent Labs   Lab Test 05/16/19  1436      POTASSIUM 4.1   CHLORIDE 105   CO2 31   BUN 26   CR 0.66   ANIONGAP 6   MARIAJOSE 8.8   GLC 97   ,   Results for orders placed or performed during the hospital encounter of 05/16/19   XR Chest 2 Views    Narrative    CHEST TWO VIEWS 5/16/2019 4:47 PM     HISTORY: Chest pain, bilateral rales.    COMPARISON: None.     FINDINGS: Small bilateral pleural effusions, left worse than right.  Mild associated compressive atelectasis and/or infiltrate. Mild  scattered interstitial and/or fibrotic changes. The cardiac silhouette  is not enlarged. Pulmonary vasculature is unremarkable.      Impression    IMPRESSION: Small bilateral pleural effusions and associated  atelectasis and/or infiltrate, left worse than right.    CARLEE MORENO MD   CT Chest (PE) Abdomen Pelvis w Contrast    Narrative    CT CHEST PE ABDOMEN AND PELVIS WITH  CONTRAST 5/16/2019 6:00 PM     HISTORY: Abnormal findings in other body fluids and substances. Chest  pain, abdominal pain, elevated D-dimer.      TECHNIQUE: Volumetric acquisition through chest, abdomen and pelvis  with IV contrast.   52mL Isovue-370  Radiation dose for this scan was  reduced using automated exposure control, adjustment of the mA and/or  kV according to patient size, or iterative reconstruction technique.    COMPARISON: None.    FINDINGS:   Chest: No visualized pulmonary embolism. Slightly prominent central  pulmonary arteries may indicate pulmonary artery hypertension.  Atheromatous changes of the thoracic aorta. In the lower thoracic  aorta there is a focal area of mural thrombus/blood clot along the  anterior wall measuring up to 1.5 cm. Mild cardiomegaly. Coronary  artery calcifications.    Emphysema. Moderate pleural effusions bilaterally. Bibasilar opacities  likely atelectasis. Mild atelectasis in the right middle lobe and  lingula.    Abdomen and pelvis: Liver, gallbladder, spleen, pancreas, adrenal  glands and kidneys demonstrate no worrisome findings. 3.2 cm cyst  arising from the lower left kidney. Mild ptosis of the right kidney.  No hydronephrosis. Possible slight thickening of the left adrenal  gland.    Uterus is present with small calcified fibroids. Atheromatous changes  of the abdominal aorta and its branches, moderate stool in the colon.  Colonic diverticulosis. No bowel obstruction, ascites or focal  inflammatory changes demonstrated. Bone windows demonstrate moderate  degenerative changes in the visualized spine. Moderate loss of height  of T6 and to a lesser degree T8, age indeterminate. There is also mild  loss of height of T12 and L4. Old proximal femur fracture deformities  with compression screw fixation.    IMPRESSION  1. No visualized pulmonary embolism. Possible pulmonary hypertension.  2. Moderate bilateral pleural effusions and areas of bilateral  atelectasis. Mild  cardiomegaly.  3. 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 Emphysema.    ANNELIESE MARIE MD   US Lower Extremity Venous Duplex Bilateral    Narrative    PROCEDURE:  Venous Doppler ultrasound of the bilateral lower extremities    DATE OF PROCEDURE:  5/16/2019 11:49 PM    CLINICAL HISTORY/INDICATION:   89-year-old female with leg pain and swelling.    COMPARISON:   None relevant    TECHNIQUE:   Grayscale, color-flow, and spectral waveform analysis were performed  of the deep veins of the bilateral lower extremities    FINDINGS:   The right common femoral vein, superficial femoral vein and popliteal  vein demonstrate normal compressibility, spectral waveform, color flow  and augmentation.    The left common femoral vein, superficial femoral vein and popliteal  vein demonstrate normal compressibility, spectral waveform, color flow  and augmentation.    The right posterior tibial vein, peroneal vein, and greater saphenous  vein are compressible.    The left posterior tibial vein, peroneal vein, and greater saphenous  vein are compressible      Impression    IMPRESSION:   1. No evidence of deep venous thrombosis in the right lower extremity  2. No evidence of deep venous thrombosis in the left lower extremity    DYLAN CASTILLO MD       Discharge Medications   There are no discharge medications for this patient.    Allergies   Allergies not on file

## 2019-05-17 NOTE — PROGRESS NOTES
RECEIVING UNIT ED HANDOFF REVIEW    ED Nurse Handoff Report was reviewed by: Macie Herrera on May 16, 2019 at 8:16 PM

## 2019-05-17 NOTE — UTILIZATION REVIEW
"[facility] Utilization Review  Admission Status; Secondary Review Determination     Admission Date: 5/16/2019  2:14 PM      Under the authority of the Utilization Management Committee, the utilization review process indicated a secondary review on the above patient.  The review outcome is based on review of the medical records, discussions with staff, and applying clinical experience noted on the date of the review.        (X) Observation Status Appropriate - This patient does not meet hospital inpatient criteria and is placed in observation status. If this patient's primary payer is Medicare and was admitted as an inpatient, Condition Code 44 should be used and patient status changed to \"observation\".   () Observation Status concurrent Review           RATIONALE FOR DETERMINATION   89-year-old female with no past medical history admitted with shortness of breath and pleuritic chest pain in the epigastric area,  Associated with right lower extremity pain and swelling.  CT chest PE negative for pulmonary embolism, showed bilateral pleural effusions with atelectasis.  CT abdomen shows abnormal area of the thoracic aortic aneurysm, which appeared to contain thrombus.  Patient had elevated BNP.  Patient received IV Lasix, lower extremity Doppler showed no evidence of DVT.  Vascular surgeon does not think it is a thrombus but recommend soft plaque, no need for anticoagulation.  Patient was admitted as inpatient but is already discharged by the time Case was reviewed, should have been appropriate for observation stay, notified attending provider as well      The severity of illness, intensity of service provided, expected LOS make the care appropriate for observation status at this time.        The information on this document is developed by the utilization review team in order for the business office to ensure compliance.  This only denotes the appropriateness of proper admission status and does not reflect " the quality of care rendered.              Sincerely,       Aleida Briggs MD  Physician Advisor  Utilization Review-[place]    Phone: [phone]

## 2019-05-17 NOTE — PLAN OF CARE
Pt is A/O x4. SBA with walker. VSS, RA. IV SL. Denies pain. Minimal WAITE. Patient was cleared for discharge this morning. Patient was not prescribed any new medications, discussed activity, diet and follow-up with primary. Pt verbalized understanding. Currently waiting for ride from son and stable to discharge.

## 2019-05-17 NOTE — ED NOTES
"[facility]  ED Nurse Handoff Report    ED Chief complaint: Abdominal Pain and Chest Pain      ED Diagnosis:   Final diagnoses:   None       Code Status: see epic    Allergies: Allergies not on file    Activity level - Baseline/Home:  Independent    Activity Level - Current:   Stand with Assist     Needed?: No    Isolation: No  Infection: Not Applicable  Bariatric?: No    Vital Signs:   Vitals:    05/16/19 1730 05/16/19 1800 05/16/19 1830 05/16/19 1850   BP:  (!) 161/97  (!) 173/104   Pulse:       Resp: 24 14 12 18   Temp:       TempSrc:       SpO2:  94%     Weight:       Height:           Cardiac Rhythm: ,   Cardiac  Cardiac Rhythm: Atrial fibrillation(rvr up tp 110 s)    Pain level: 0-10 Pain Scale: 2    Is this patient confused?: No   Does this patient have a guardian?  Yes         If yes, is there guardianship documents in the Epic \"Code/ACP\" activity?  N/A         Guardian Notified?  N/A  [place] - Suicide Severity Rating Scale Completed?  Yes  If yes, what color did the patient score?  White    Patient Report: Initial Complaint: pt lives in senior living. At 530am today developed epigastric pain which continued throughout the day, RN called EMS. Pt given nitroX2 and 162mg asa enroute, which only slightly decreased her pain.   Focused Assessment: AOx4, very with it. Up with SBA, steady on her feet. C/o epigastric pain and rib pain that worsens with movement and deep breaths. Tele is irregular and tachy, but per MD not A fib, ranging from 80s-160s, BP runs high. Pt is resting comfortably in bed.   Tests Performed: labs, ct chest, cxr, UA  Abnormal Results: ^ d dimer, bilateral pleural effusions, CT shows a clot in her aorta but per vascular surgery there are no needed interventions  Treatments provided: LR    Family Comments: at bedside. 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     OBS brochure/video discussed/provided to patient/family: N/A              " Name of person given brochure if not patient: na              Relationship to patient: na    ED Medications:   Medications   lactated ringers BOLUS 500 mL (0 mLs Intravenous Stopped 5/16/19 1854)   iopamidol (ISOVUE-370) solution 52 mL (52 mLs Intravenous Given 5/16/19 1747)   sodium chloride 0.9 % bag 100mL for CT scan flush use (80 mLs Intravenous Given 5/16/19 1747)       Drips infusing?:  No    For the majority of the shift this patient was Green.   Interventions performed were none.    Severe Sepsis OR Septic Shock Diagnosis Present: No    To be done/followed up on inpatient unit:  nothing currently     ED NURSE PHONE NUMBER: [phone]

## 2019-05-23 ENCOUNTER — HOSPITAL ENCOUNTER (EMERGENCY)
Facility: CLINIC | Age: 84
End: 2019-05-23
Payer: COMMERCIAL

## 2019-07-09 ENCOUNTER — RECORDS - HEALTHEAST (OUTPATIENT)
Dept: LAB | Facility: CLINIC | Age: 84
End: 2019-07-09

## 2019-07-10 LAB
ANION GAP SERPL CALCULATED.3IONS-SCNC: 10 MMOL/L (ref 5–18)
BUN SERPL-MCNC: 22 MG/DL (ref 8–28)
CALCIUM SERPL-MCNC: 9.2 MG/DL (ref 8.5–10.5)
CHLORIDE BLD-SCNC: 99 MMOL/L (ref 98–107)
CO2 SERPL-SCNC: 32 MMOL/L (ref 22–31)
CREAT SERPL-MCNC: 0.8 MG/DL (ref 0.6–1.1)
GFR SERPL CREATININE-BSD FRML MDRD: >60 ML/MIN/1.73M2
GLUCOSE BLD-MCNC: 120 MG/DL (ref 70–125)
HGB BLD-MCNC: 14.2 G/DL (ref 12–16)
POTASSIUM BLD-SCNC: 4 MMOL/L (ref 3.5–5)
SODIUM SERPL-SCNC: 141 MMOL/L (ref 136–145)

## 2019-12-24 ENCOUNTER — RECORDS - HEALTHEAST (OUTPATIENT)
Dept: LAB | Facility: CLINIC | Age: 84
End: 2019-12-24

## 2019-12-24 LAB
ANION GAP SERPL CALCULATED.3IONS-SCNC: 5 MMOL/L (ref 5–18)
BUN SERPL-MCNC: 23 MG/DL (ref 8–28)
CALCIUM SERPL-MCNC: 9.2 MG/DL (ref 8.5–10.5)
CHLORIDE BLD-SCNC: 101 MMOL/L (ref 98–107)
CO2 SERPL-SCNC: 33 MMOL/L (ref 22–31)
CREAT SERPL-MCNC: 0.7 MG/DL (ref 0.6–1.1)
GFR SERPL CREATININE-BSD FRML MDRD: >60 ML/MIN/1.73M2
GLUCOSE BLD-MCNC: 77 MG/DL (ref 70–125)
HGB BLD-MCNC: 12.3 G/DL (ref 12–16)
POTASSIUM BLD-SCNC: 4.3 MMOL/L (ref 3.5–5)
SODIUM SERPL-SCNC: 139 MMOL/L (ref 136–145)

## 2020-01-07 ENCOUNTER — RECORDS - HEALTHEAST (OUTPATIENT)
Dept: LAB | Facility: CLINIC | Age: 85
End: 2020-01-07

## 2020-01-07 LAB
ANION GAP SERPL CALCULATED.3IONS-SCNC: 7 MMOL/L (ref 5–18)
BASOPHILS # BLD AUTO: 0.1 THOU/UL (ref 0–0.2)
BASOPHILS NFR BLD AUTO: 2 % (ref 0–2)
BUN SERPL-MCNC: 20 MG/DL (ref 8–28)
CALCIUM SERPL-MCNC: 9.1 MG/DL (ref 8.5–10.5)
CHLORIDE BLD-SCNC: 96 MMOL/L (ref 98–107)
CO2 SERPL-SCNC: 31 MMOL/L (ref 22–31)
CREAT SERPL-MCNC: 0.71 MG/DL (ref 0.6–1.1)
EOSINOPHIL # BLD AUTO: 0.8 THOU/UL (ref 0–0.4)
EOSINOPHIL NFR BLD AUTO: 12 % (ref 0–6)
ERYTHROCYTE [DISTWIDTH] IN BLOOD BY AUTOMATED COUNT: 13.2 % (ref 11–14.5)
GFR SERPL CREATININE-BSD FRML MDRD: >60 ML/MIN/1.73M2
GLUCOSE BLD-MCNC: 77 MG/DL (ref 70–125)
HCT VFR BLD AUTO: 36.2 % (ref 35–47)
HGB BLD-MCNC: 11.5 G/DL (ref 12–16)
LYMPHOCYTES # BLD AUTO: 1.1 THOU/UL (ref 0.8–4.4)
LYMPHOCYTES NFR BLD AUTO: 16 % (ref 20–40)
MCH RBC QN AUTO: 30.4 PG (ref 27–34)
MCHC RBC AUTO-ENTMCNC: 31.8 G/DL (ref 32–36)
MCV RBC AUTO: 96 FL (ref 80–100)
MONOCYTES # BLD AUTO: 0.6 THOU/UL (ref 0–0.9)
MONOCYTES NFR BLD AUTO: 10 % (ref 2–10)
NEUTROPHILS # BLD AUTO: 3.9 THOU/UL (ref 2–7.7)
NEUTROPHILS NFR BLD AUTO: 60 % (ref 50–70)
PLATELET # BLD AUTO: 252 THOU/UL (ref 140–440)
PMV BLD AUTO: 13 FL (ref 8.5–12.5)
POTASSIUM BLD-SCNC: 4.5 MMOL/L (ref 3.5–5)
RBC # BLD AUTO: 3.78 MILL/UL (ref 3.8–5.4)
SODIUM SERPL-SCNC: 134 MMOL/L (ref 136–145)
WBC: 6.6 THOU/UL (ref 4–11)

## 2020-08-25 ENCOUNTER — RECORDS - HEALTHEAST (OUTPATIENT)
Dept: LAB | Facility: CLINIC | Age: 85
End: 2020-08-25

## 2020-08-26 LAB
ANION GAP SERPL CALCULATED.3IONS-SCNC: 8 MMOL/L (ref 5–18)
BUN SERPL-MCNC: 25 MG/DL (ref 8–28)
CALCIUM SERPL-MCNC: 9.1 MG/DL (ref 8.5–10.5)
CHLORIDE BLD-SCNC: 103 MMOL/L (ref 98–107)
CO2 SERPL-SCNC: 28 MMOL/L (ref 22–31)
CREAT SERPL-MCNC: 0.93 MG/DL (ref 0.6–1.1)
GFR SERPL CREATININE-BSD FRML MDRD: 56 ML/MIN/1.73M2
GLUCOSE BLD-MCNC: 102 MG/DL (ref 70–125)
HGB BLD-MCNC: 11.3 G/DL (ref 12–16)
POTASSIUM BLD-SCNC: 4.8 MMOL/L (ref 3.5–5)
SODIUM SERPL-SCNC: 139 MMOL/L (ref 136–145)